# Patient Record
Sex: FEMALE
[De-identification: names, ages, dates, MRNs, and addresses within clinical notes are randomized per-mention and may not be internally consistent; named-entity substitution may affect disease eponyms.]

---

## 2019-03-13 PROBLEM — Z00.00 ENCOUNTER FOR PREVENTIVE HEALTH EXAMINATION: Status: ACTIVE | Noted: 2019-03-13

## 2019-03-15 ENCOUNTER — APPOINTMENT (OUTPATIENT)
Dept: OTOLARYNGOLOGY | Facility: CLINIC | Age: 39
End: 2019-03-15
Payer: COMMERCIAL

## 2019-03-15 VITALS
HEIGHT: 67 IN | WEIGHT: 118 LBS | DIASTOLIC BLOOD PRESSURE: 73 MMHG | HEART RATE: 76 BPM | SYSTOLIC BLOOD PRESSURE: 109 MMHG | BODY MASS INDEX: 18.52 KG/M2

## 2019-03-15 DIAGNOSIS — J30.2 OTHER SEASONAL ALLERGIC RHINITIS: ICD-10-CM

## 2019-03-15 DIAGNOSIS — J45.30 MILD PERSISTENT ASTHMA, UNCOMPLICATED: ICD-10-CM

## 2019-03-15 DIAGNOSIS — K21.9 GASTRO-ESOPHAGEAL REFLUX DISEASE W/OUT ESOPHAGITIS: ICD-10-CM

## 2019-03-15 DIAGNOSIS — Z82.49 FAMILY HISTORY OF ISCHEMIC HEART DISEASE AND OTHER DISEASES OF THE CIRCULATORY SYSTEM: ICD-10-CM

## 2019-03-15 PROCEDURE — 31231 NASAL ENDOSCOPY DX: CPT

## 2019-03-15 PROCEDURE — 99213 OFFICE O/P EST LOW 20 MIN: CPT | Mod: 25

## 2019-03-15 NOTE — HISTORY OF PRESENT ILLNESS
[de-identified] : Lakisha Miller is a very pleasant 38-year-old patient with a history of chronic sinusitis. She was last seen 1-2 years ago. She has recurrent sinus infections. She was treated 4 to 5 times per year. She also has chronic nasal congestion, obstruction, loss of smell, and sinus pressure. She has been sick for the past 6 weeks. She was treated with 2 courses of a Z-Wayne and prednisone. She started taking doxycycline which she is still on. She may be a little bit better. She uses nasal saline rinses and Flonase as needed. She had a CT scan in the past which shows sinus inflammation. She has allergies but she has not had testing. She has TMD dysfunction but does not use her nightguard

## 2019-03-15 NOTE — ASSESSMENT
[FreeTextEntry1] : She has a history of chronic sinusitis, chronic rhinitis, recurrent sinus infections, and nasal polyps. She has findings consistent with a sinus infection on exam. She has allergies but has not had testing.\par \par She had reflux-related laryngeal changes on flexible laryngoscopy\par \par PLAN\par \par -findings and management options discussed in detail with the patient. \par -Nasal saline rinses, nasal steroid spray (such as fluticasone), antihistamine/decongestant as needed\par -she will complete the course of doxycylcline (the antibiotics will be changed depending on the culture results).  I also gave her another burst of prednisone. she has taken the prednisone without problems. .\par -she was asked to call for the results of the culture in 5-7 days.\par -Allergy evaluation\par -CT scan of the sinuses- she will be sent for the scan wheh the infection has resolved\par -She will likely benefit from endoscopic sinus surgery with nasal polypectomy. I will speak with her about this further after the scan\par -Reflux precautions and zantac\par -she should wear her nightguard for TMJD\par -follow up 2 weeks to ensure the infection has resolved\par -call and return earlier if any concerns. \par

## 2019-03-21 LAB — BACTERIA FLD CULT: ABNORMAL

## 2019-04-05 ENCOUNTER — APPOINTMENT (OUTPATIENT)
Dept: OTOLARYNGOLOGY | Facility: CLINIC | Age: 39
End: 2019-04-05
Payer: COMMERCIAL

## 2019-04-05 VITALS
BODY MASS INDEX: 18.52 KG/M2 | SYSTOLIC BLOOD PRESSURE: 110 MMHG | HEIGHT: 67 IN | WEIGHT: 118 LBS | HEART RATE: 101 BPM | DIASTOLIC BLOOD PRESSURE: 75 MMHG

## 2019-04-05 PROCEDURE — 31231 NASAL ENDOSCOPY DX: CPT

## 2019-04-05 PROCEDURE — 99213 OFFICE O/P EST LOW 20 MIN: CPT | Mod: 25

## 2019-04-05 RX ORDER — FLUCONAZOLE 150 MG/1
150 TABLET ORAL
Qty: 2 | Refills: 0 | Status: DISCONTINUED | COMMUNITY
Start: 2019-03-15 | End: 2019-04-05

## 2019-04-05 RX ORDER — DOXYCYCLINE HYCLATE 80 MG/1
TABLET, DELAYED RELEASE ORAL
Refills: 0 | Status: DISCONTINUED | COMMUNITY
End: 2019-04-05

## 2019-04-05 RX ORDER — RANITIDINE 75 MG/1
75 TABLET ORAL
Qty: 60 | Refills: 3 | Status: DISCONTINUED | COMMUNITY
Start: 2019-03-15 | End: 2019-04-05

## 2019-04-05 RX ORDER — DOXYCYCLINE 100 MG/1
100 CAPSULE ORAL TWICE DAILY
Qty: 28 | Refills: 0 | Status: DISCONTINUED | COMMUNITY
Start: 2019-03-15 | End: 2019-04-05

## 2019-04-05 RX ORDER — FLUTICASONE PROPIONATE 50 MCG
50 SPRAY, SUSPENSION NASAL
Refills: 0 | Status: ACTIVE | COMMUNITY

## 2019-04-05 RX ORDER — PREDNISONE 10 MG/1
10 TABLET ORAL
Qty: 12 | Refills: 0 | Status: DISCONTINUED | COMMUNITY
Start: 2019-03-15 | End: 2019-04-05

## 2019-04-05 NOTE — ASSESSMENT
[FreeTextEntry1] : She has a history of chronic sinusitis, chronic rhinitis, recurrent sinus infections, and nasal polyps. she is feeling a lot better and her exam shows significant improvement. \par PLAN\par \par -findings and management options discussed in detail with the patient. \par -Nasal saline rinses, nasal steroid spray (such as fluticasone), antihistamine/decongestant as needed\par -Allergy evaluation is pending. \par -CT scan of the sinuses \par -we will discuss possible endoscopic sinus surgery with nasal polypectomy after the scan\par -Reflux precautions and zantac prn\par -follow up after the CT scan. \par -call and return earlier if any concerns. \par

## 2019-04-05 NOTE — HISTORY OF PRESENT ILLNESS
[de-identified] : 3/15/19- \par Lakisha Miller is a very pleasant 38-year-old patient with a history of chronic sinusitis. She was last seen 1-2 years ago. She has recurrent sinus infections. She was treated 4 to 5 times per year. She also has chronic nasal congestion, obstruction, loss of smell, and sinus pressure. She has been sick for the past 6 weeks. She was treated with 2 courses of a Z-Wayne and prednisone. She started taking doxycycline which she is still on. She may be a little bit better. She uses nasal saline rinses and Flonase as needed. She had a CT scan in the past which shows sinus inflammation. She has allergies but she has not had testing. She has TMJD dysfunction but does not use her nightguard\par NE/FL. culture grew rare Propionibacterium acnes [FreeTextEntry1] : \par 4/5/19- she has mild sinus pressure and postnasal drip. She is feeling a lot better since her last visit. She finished the antibiotics. She does not think she took the prednisone. She uses nasal saline rinses and flonase. Allergy evaluation is pending.

## 2019-05-02 ENCOUNTER — APPOINTMENT (OUTPATIENT)
Dept: OTOLARYNGOLOGY | Facility: CLINIC | Age: 39
End: 2019-05-02
Payer: COMMERCIAL

## 2019-05-02 DIAGNOSIS — J33.9 NASAL POLYP, UNSPECIFIED: ICD-10-CM

## 2019-05-02 DIAGNOSIS — J32.9 CHRONIC SINUSITIS, UNSPECIFIED: ICD-10-CM

## 2019-05-02 DIAGNOSIS — J31.0 CHRONIC RHINITIS: ICD-10-CM

## 2019-05-02 DIAGNOSIS — J34.89 OTHER SPECIFIED DISORDERS OF NOSE AND NASAL SINUSES: ICD-10-CM

## 2019-05-02 PROCEDURE — 31231 NASAL ENDOSCOPY DX: CPT

## 2019-05-02 PROCEDURE — 99213 OFFICE O/P EST LOW 20 MIN: CPT | Mod: 25

## 2019-05-02 NOTE — HISTORY OF PRESENT ILLNESS
[FreeTextEntry1] : \par \par 5/2/19- Lakisha Miller Is here for followup. She continues to do well. She has mild postnasal drip, nasal congestion, and sinus pressure. She is using Flonase and in any part. She did see the allergies. She does have allergies. She had a CT scan of the sinuses. We reviewed the scan.\par \par CT scan sinuses-deviated septum to the left. Completely opacified right frontal bullar cell on the right. Mild mucosal thickening along the floor of the maxillary sinuses. Patent but narrow OMCs. Opacification of a right Onodi cell [de-identified] : 3/15/19- \par Lakisha Miller is a very pleasant 38-year-old patient with a history of chronic sinusitis. She was last seen 1-2 years ago. She has recurrent sinus infections. She was treated 4 to 5 times per year. She also has chronic nasal congestion, obstruction, loss of smell, and sinus pressure. She has been sick for the past 6 weeks. She was treated with 2 courses of a Z-Wayne and prednisone. She started taking doxycycline which she is still on. She may be a little bit better. She uses nasal saline rinses and Flonase as needed. She had a CT scan in the past which shows sinus inflammation. She has allergies but she has not had testing. She has TMJD dysfunction but does not use her nightguard\par NE/FL. culture grew rare Propionibacterium acnes \par    \par 4/5/19- she has mild sinus pressure and postnasal drip. She is feeling a lot better since her last visit. She finished the antibiotics. She does not think she took the prednisone. She uses nasal saline rinses and flonase. Allergy evaluation is pending.

## 2019-05-02 NOTE — ASSESSMENT
[FreeTextEntry1] : She has a history of chronic sinusitis, chronic rhinitis, recurrent sinus infections, and nasal polyps. She also has a deviated septum and inferior turbinate hypertrophy. She had the CT scan. We reviewed the results. She has mild symptoms but is otherwise doing well\par \par PLAN\par \par -findings and management options discussed in detail with the patient. \par -Nasal saline rinses, nasal steroid spray (such as fluticasone), antihistamine/decongestant as needed\par -allergy precautions\par -Reflux precautions and medication (OTC zantac) as needed\par -The treatment options were reviewed with the patient- observation with continue medical therapy and surgery.  The patient is a candidate for bilateral image guided endoscopic sinus surgery, septoplasty, and inferior turbinate reduction. I reviewed the procedure and the risks/benefits of the options. The risks were discussed in detail and include but are not limited to anesthesia complications, bleeding, infection, worsening or persistent symptoms, septal perforation, change in smell/taste, numbness of teeth, scarring, need for further procedures, headaches/facial pain, CSF leak/skull base injury and visual changes/orbital injury. The expected postoperative course was reviewed The importance of postoperative care was also discussed. She is going to think it over. \par -follow up in 3 months if she opts for observation. She will return earlier if she has a sinus infection. If she would like to proceed with surgery, she will call\par -call and return earlier if any concerns. \par

## 2019-08-09 ENCOUNTER — APPOINTMENT (OUTPATIENT)
Dept: OTOLARYNGOLOGY | Facility: CLINIC | Age: 39
End: 2019-08-09

## 2020-01-17 ENCOUNTER — APPOINTMENT (OUTPATIENT)
Dept: OTOLARYNGOLOGY | Facility: CLINIC | Age: 40
End: 2020-01-17

## 2020-01-24 ENCOUNTER — APPOINTMENT (OUTPATIENT)
Dept: OTOLARYNGOLOGY | Facility: CLINIC | Age: 40
End: 2020-01-24